# Patient Record
(demographics unavailable — no encounter records)

---

## 2025-05-04 NOTE — THERAPY
[Today's Date] : [unfilled] [Other:___] : [unfilled] [_____] :  [unfilled] units/hour [Breakfast Carbohydrate Ratio:  1 unit for every ___ grams of carbohydrates] : Breakfast Carbohydrate Ratio: 1 unit for every [unfilled] grams of carbohydrates [Lunch Carbohydrate Ratio:       1 unit for every ___ grams of carbohydrates] : Lunch Carbohydrate Ratio: 1 unit for every [unfilled] grams of carbohydrates [Dinner Carbohydrate Ratio:       1 unit for every ___ grams of carbohydrates] : Dinner Carbohydrate Ratio: 1 unit for every [unfilled] grams of carbohydrates [BG Target = ____] : BG Target = [unfilled] [Insulin Sensitivity Factor = ____] : Insulin Sensitivity Factor = [unfilled] [Insulin on Board (IOB) Duration = ____ hours] : Insulin on Board (IOB) Duration  = [unfilled] hours [FreeTextEntry3] : 12A 1 unit: 6 grams, 9A 1 unit: 8 grams, 1P 7 grams CHO

## 2025-05-04 NOTE — REVIEW OF SYSTEMS
[Nl] : Neurological [Anxiety] : anxiety [Headache] : headache [Depression] : no depression [Relationship Problems] : no interpersonal relationship problems [Emotional Problems] : no emotional problems/concerns [Sleep Disturbances] : no sleep disturbances [Decreased Functioning] : normal functioning ability

## 2025-05-04 NOTE — CONSULT LETTER
[Dear  ___] : Dear  [unfilled], [Courtesy Letter:] : I had the pleasure of seeing your patient, [unfilled], in my office today. [Consult Closing:] : Thank you very much for allowing me to participate in the care of this patient.  If you have any questions, please do not hesitate to contact me. [Sincerely,] : Sincerely, [FreeTextEntry2] : Eric Valverde MD [FreeTextEntry3] : Mandy Agustin MD

## 2025-05-04 NOTE — DISCUSSION/SUMMARY
[FreeTextEntry1] : Cher is an 18 year old female with type 1 diabetes X 6 years with good glycemic control.  Her HgbA1C today is 7.1%.  Review of glucose data shows majority of blood glucose in normal range. 1- No change to automated insulin dosing 2-Annual DM labs ordered for now 3- Discussed transition to adult endocrine care once Cher graduates 4- Discussed college management of T1DM and discussed EtOH affects on glucose and how to manage T1DM with EtOH

## 2025-05-04 NOTE — HISTORY OF PRESENT ILLNESS
[Irregular Periods] : irregular periods [4] :  blood sugar levels are tested 4 times per day [3] : the pump insertion site is changed every 3 days [Arms] : arms [Abdomen] : abdomen [_____ times per night] : [unfilled] time(s) per night [_____ times per week] : mild symptoms occuring [unfilled] time(s) per week [_____ times per month] : severe symptoms occuring [unfilled] time(s) per month [Previous Hypoglycemic Seizure] : has no history of hypoglycemic seizure [FreeTextEntry2] : Cher is 18 year old female (diagnosed at age 10) presenting for follow up of type 1 diabetes.  She was  last seen by me in 1/2025 and had HgbA1C of 7.1% at that visit.  She was previously followed by Dr. Adonis Molina in [place] prior to moving here (was in [place] for 1 year, previously in [place] following with Dr. Bauer, where she was diagnosed).  Sury has been well since her last visit.  She is less stressed with school and recently made dose adjustment on pump. She is now on Omnipod/Dexcom.  Since last visit she has had not had episodes of hypoglycemia or hyperglycemia with ketonuria.   Cher menses are more regular, she does not recall date of LMP.  Diabetes Health Maintenance: Annual labs 4/2024 Eye exam scheduled for this Spring.     [FreeTextEntry1] : Treats lows with 2 glucose tabs.

## 2025-05-04 NOTE — HISTORY OF PRESENT ILLNESS
[Irregular Periods] : irregular periods [4] :  blood sugar levels are tested 4 times per day [3] : the pump insertion site is changed every 3 days [Arms] : arms [Abdomen] : abdomen [_____ times per night] : [unfilled] time(s) per night [_____ times per week] : mild symptoms occuring [unfilled] time(s) per week [_____ times per month] : severe symptoms occuring [unfilled] time(s) per month [Previous Hypoglycemic Seizure] : has no history of hypoglycemic seizure [FreeTextEntry2] : Cher is 18 year old female (diagnosed at age 10) presenting for follow up of type 1 diabetes.  She was  last seen by me in 1/2025 and had HgbA1C of 7.1% at that visit.  She was previously followed by Dr. Adonis Molina in [place] prior to moving here (was in [place] for 1 year, previously in [place] following with Dr. Bauer, where she was diagnosed).  Sury has been well since her last visit.  She is less stressed with school and recently made dose adjustment on pump. She is now on Omnipod/Dexcom.  Since last visit she has had not had episodes of hypoglycemia or hyperglycemia with ketonuria.   Cher menses are more regular, she does not recall date of LMP.  Diabetes Health Maintenance: Annual labs 4/2024 Eye exam scheduled for this Spring.     [FreeTextEntry1] : Treats lows with 2 glucose tabs.